# Patient Record
(demographics unavailable — no encounter records)

---

## 2025-01-28 NOTE — DISCUSSION/SUMMARY
[FreeTextEntry1] : COPD STABLE EX SMOKER NOT COMPLIANT WITH SYMBICORT LUNG NODULE CHEST CT  COUNSELED ABOUT COMPLIANCE WITH MEDS ORDER NEB ENT NOTED

## 2025-01-28 NOTE — HISTORY OF PRESENT ILLNESS
[TextBox_4] : COPD NOT COMPLAINT WITH SYMBICORT SP CHEST CT SAW ENT SOB ON EXERTION FOLLOWED BY CARDIO

## 2025-03-12 NOTE — END OF VISIT
[] : Fellow [FreeTextEntry3] : EVENTS NOTED, COPD NOT COMPLAINT, LOW POX CHEST CT DISCUSSED, PREDNISONE/ REPEAT CT

## 2025-03-12 NOTE — DISCUSSION/SUMMARY
[FreeTextEntry1] : COPD STABLE EX SMOKER NOT COMPLIANT WITH SYMBICORT,  CHEST CT REVIEWED; MILD EMPHYSEMA, WAXING AND WANING MULTILOBAR AIRWAY IMPACTION COUNSELED ABOUT COMPLIANCE WITH NEBULIZERS AT LEAST 1-2X/DAY, REPEAT CHEST CT 3-6 MONTHS DISCUSS BRONCH WILL ORDER PREDNISONE   ENT NOTED  F/U 07/25

## 2025-03-28 NOTE — ASSESSMENT
[FreeTextEntry1] : NOBLE URIARTE is an 82-year-old female patient who presents today for follow-up for h/o BIRADS-3 imaging review - now deemed BIRADS-2. Since her last visit, she has no new breast related complaints.   Most recent imaging: B/L Dx Mammo - 02/24/2025: -There are scattered areas of fibroglandular density. -No new or suspicious masses or areas of architectural distortion are seen either breast.  -The calcifications, situated in the right breast, show no change in number, distribution or morphology.  These calcifications have demonstrated over 2 years of stability and are benign. Recommendation: Unless otherwise indicated by clinical findings, annual screening mammography recommended. BI-RADS Category 2: Benign  I had a lengthy discussion with this patient regarding diagnostic results, impressions, recommendations, risks and benefits.  --- We re-discussed BIRADS system of grading and that her findings fall into category 3. Category 3 carries a less than 5% risk of malignancy. She can choose to follow up imaging. She is also aware that she can also choose to biopsy the lesion if she wishes to. I briefly discussed the procedure will be performed by a breast imaging specialist, and will include numbing with local anesthesia, followed by a small biopsy marker placement afterwards, which is usually not bothersome, and is not recognized by radiofrequency devices.  She understands her options and wants to proceed with observation with short term imaging follow up at this time.  All questions and concerns were answered in detail.  PLAN: -B/L Screening Mammo - February 2026. -Follow up after imaging, pending any interval changes.  I spent a total of 20 minutes of face-to-face time with this patient, greater than 50% of which was spent in counseling and/or coordination of care. All of her questions were appropriately answered. She knows to call with any concerns.

## 2025-03-28 NOTE — DATA REVIEWED
[FreeTextEntry1] : B/L Dx Mammo - 02/24/2025: Breast composition:There are scattered areas of fibroglandular density.  No new or suspicious masses or areas of architectural distortion are seen either breast. The calcifications, situated in the right breast, show no change in number, distribution or morphology. These calcifications have demonstrated over 2 years of stability and are benign.  Impression: Benign right breast calcifications.  No evidence of malignancy.  Recommendation: Unless otherwise indicated by clinical findings, annual screening mammography recommended.  BI-RADS Category 2: Benign

## 2025-03-28 NOTE — PHYSICAL EXAM
[Normocephalic] : normocephalic [Atraumatic] : atraumatic [No Supraclavicular Adenopathy] : no supraclavicular adenopathy [No dominant masses] : no dominant masses in right breast  [No dominant masses] : no dominant masses left breast [No Nipple Discharge] : no left nipple discharge [No Rashes] : no rashes [No Ulceration] : no ulceration [Breast Nipple Inversion] : nipples not inverted [Breast Nipple Retraction] : nipples not retracted [de-identified] : B/L nonspecific nodularity; no suspicious palpable findings. [de-identified] : No axillary lymphadenopathy appreciated. [de-identified] : No axillary lymphadenopathy appreciated.

## 2025-03-28 NOTE — REASON FOR VISIT
[Follow-Up: _____] : a [unfilled] follow-up visit [FreeTextEntry1] : h/o BIRADS-3 imaging review - now deemed BIRADS-2.

## 2025-07-02 NOTE — DISCUSSION/SUMMARY
[FreeTextEntry1] : COPD STABLE EX SMOKER  CHEST CT REVIEWED; MILD EMPHYSEMA, WAXING AND WANING MULTILOBAR AIRWAY IMPACTION COUNSELED ABOUT COMPLIANCE WITH NEBULIZERS AT LEAST 1-2X/DAY, REPEAT CHEST CT NOTED ENT NOTED ON MTX DC OXYGEN

## 2025-07-02 NOTE — HISTORY OF PRESENT ILLNESS
[TextBox_4] : COPD NOT USING OXYEN FEELS BETTER ON MTX SP CHEST CT 02/25 SAW ENT SOB ON EXERTION FOLLOWED BY CARDIO